# Patient Record
Sex: FEMALE | Race: WHITE | NOT HISPANIC OR LATINO
[De-identification: names, ages, dates, MRNs, and addresses within clinical notes are randomized per-mention and may not be internally consistent; named-entity substitution may affect disease eponyms.]

---

## 2018-05-12 PROBLEM — Z00.00 ENCOUNTER FOR PREVENTIVE HEALTH EXAMINATION: Status: ACTIVE | Noted: 2018-05-12

## 2018-05-23 ENCOUNTER — RECORD ABSTRACTING (OUTPATIENT)
Age: 68
End: 2018-05-23

## 2018-05-23 DIAGNOSIS — Z87.898 PERSONAL HISTORY OF OTHER SPECIFIED CONDITIONS: ICD-10-CM

## 2018-05-23 DIAGNOSIS — M19.90 UNSPECIFIED OSTEOARTHRITIS, UNSPECIFIED SITE: ICD-10-CM

## 2018-05-23 DIAGNOSIS — Z87.09 PERSONAL HISTORY OF OTHER DISEASES OF THE RESPIRATORY SYSTEM: ICD-10-CM

## 2018-05-23 DIAGNOSIS — Z78.9 OTHER SPECIFIED HEALTH STATUS: ICD-10-CM

## 2018-05-23 DIAGNOSIS — R59.0 LOCALIZED ENLARGED LYMPH NODES: ICD-10-CM

## 2018-05-23 RX ORDER — ASCORBIC ACID 500 MG
TABLET ORAL
Refills: 0 | Status: ACTIVE | COMMUNITY

## 2018-06-22 ENCOUNTER — APPOINTMENT (OUTPATIENT)
Dept: PLASTIC SURGERY | Facility: CLINIC | Age: 68
End: 2018-06-22
Payer: SELF-PAY

## 2018-06-22 VITALS
TEMPERATURE: 98.4 F | RESPIRATION RATE: 18 BRPM | WEIGHT: 134 LBS | HEART RATE: 71 BPM | SYSTOLIC BLOOD PRESSURE: 122 MMHG | DIASTOLIC BLOOD PRESSURE: 81 MMHG | HEIGHT: 66 IN | OXYGEN SATURATION: 99 % | BODY MASS INDEX: 21.53 KG/M2

## 2018-06-22 PROCEDURE — 99211 OFF/OP EST MAY X REQ PHY/QHP: CPT | Mod: NC

## 2018-06-22 PROCEDURE — 99024 POSTOP FOLLOW-UP VISIT: CPT

## 2019-01-04 ENCOUNTER — MESSAGE (OUTPATIENT)
Age: 69
End: 2019-01-04

## 2019-02-06 ENCOUNTER — MESSAGE (OUTPATIENT)
Age: 69
End: 2019-02-06

## 2019-02-11 ENCOUNTER — APPOINTMENT (OUTPATIENT)
Dept: PLASTIC SURGERY | Facility: HOSPITAL | Age: 69
End: 2019-02-11
Payer: MEDICARE

## 2019-02-11 PROCEDURE — 19328 RMVL INTACT BREAST IMPLANT: CPT | Mod: NC,59

## 2019-02-11 PROCEDURE — 19340 INSJ BREAST IMPLT SM D MAST: CPT

## 2019-02-15 ENCOUNTER — APPOINTMENT (OUTPATIENT)
Dept: PLASTIC SURGERY | Facility: CLINIC | Age: 69
End: 2019-02-15
Payer: SELF-PAY

## 2019-02-15 PROCEDURE — 99024 POSTOP FOLLOW-UP VISIT: CPT

## 2019-02-20 NOTE — HISTORY OF PRESENT ILLNESS
[FreeTextEntry1] : pt is s/p removal capsules and saline implants and replacement with gel implants differentially sized  The patient denies fever,chills, shortness of breath, chest pain, calf pain. there is no evidence of seroma or infection  scar well healed .  good shape and symmetry  pt states l breast feeling is less, but thick capsule present on that side may have included nerve or cautery may have affected nerve temporarily.  will observe for nipple areolar function to determine if loss is reversible

## 2019-02-20 NOTE — ASSESSMENT
[FreeTextEntry1] : s/p removal capsules bilat and replacement with gel implants . pt is doing well instructions reviewed rto 6 weeks for ck

## 2019-03-19 ENCOUNTER — APPOINTMENT (OUTPATIENT)
Dept: PLASTIC SURGERY | Facility: CLINIC | Age: 69
End: 2019-03-19
Payer: SELF-PAY

## 2019-03-19 PROCEDURE — 99024 POSTOP FOLLOW-UP VISIT: CPT

## 2019-03-19 NOTE — ASSESSMENT
[FreeTextEntry1] : pt is happy with her repair and hoping for more feeling in l breast.  she is happy not to have contracture recur at this point as I am also. pt instructions reviewed  rto annually for ck and take antibiotics with teeth cleaning or infections that might seed breast implant

## 2019-03-19 NOTE — HISTORY OF PRESENT ILLNESS
[FreeTextEntry1] : pt is doing well l breast sensation may be returning as she feels some vague discomfort. no recurrence of contracture and she is very happy with size and shape.  on examination there is grade 1 capsules bilaterally with decreased sensation l side  nl on r.  disc complete capsulectomy of severe capsule on l may have damaged either temporarily or permanently the nerve to nipple sensation. it may return in time .

## 2019-10-25 ENCOUNTER — APPOINTMENT (OUTPATIENT)
Dept: BREAST CENTER | Facility: CLINIC | Age: 69
End: 2019-10-25
Payer: MEDICARE

## 2019-10-25 VITALS
DIASTOLIC BLOOD PRESSURE: 78 MMHG | WEIGHT: 135 LBS | SYSTOLIC BLOOD PRESSURE: 114 MMHG | BODY MASS INDEX: 21.69 KG/M2 | HEIGHT: 66 IN | HEART RATE: 64 BPM

## 2019-10-25 PROCEDURE — 99215 OFFICE O/P EST HI 40 MIN: CPT

## 2019-10-25 RX ORDER — FLUTICASONE PROPIONATE 110 UG/1
110 AEROSOL, METERED RESPIRATORY (INHALATION)
Refills: 0 | Status: ACTIVE | COMMUNITY

## 2019-10-25 NOTE — PHYSICAL EXAM
[Normocephalic] : normocephalic [Atraumatic] : atraumatic [Supple] : supple [No Supraclavicular Adenopathy] : no supraclavicular adenopathy [Examined in the supine and seated position] : examined in the supine and seated position [Symmetrical] : symmetrical [No dominant masses] : no dominant masses in right breast  [No dominant masses] : no dominant masses left breast [No Nipple Retraction] : no left nipple retraction [No Nipple Discharge] : no left nipple discharge [No Axillary Lymphadenopathy] : no left axillary lymphadenopathy [No Edema] : no edema [No Rashes] : no rashes [No Ulceration] : no ulceration [de-identified] : implants soft, symmetric no masses [de-identified] : in area of patient concern left axilla, no masses

## 2019-10-25 NOTE — ASSESSMENT
[FreeTextEntry1] : 70 yo female with FCD, dense breast\par plan mg/sono SEP 2020\par reviewed mri screening for silicone implant evaluation\par We reviewed risk reduction strategies including maintaining a BMI <25, limiting red meat intake and alcoholic beverages to 3 per week and exercise (150 min/ week low intensity or 75 min/week high intensity). And maintaining a normal vitamin D level.\par vitamin e oilmassage daily\par f/u with me 4 months if not improved with pain or next year\par She knows to call or return sooner should any concerns or questions arise.\par \par

## 2019-10-25 NOTE — REVIEW OF SYSTEMS
[Hoarseness] : hoarseness [Constipation] : constipation [Ear Discharge] : ear discharge [Lower Back Pain] : lower back pain [Joint Pain] : joint pain [Negative] : Heme/Lymph [FreeTextEntry4] : sinus pressure [FreeTextEntry9] : neck pain

## 2019-10-25 NOTE — HISTORY OF PRESENT ILLNESS
[FreeTextEntry1] : This is a 69 year old female s/p left breast ultrasound guided biopsy on 08/10/2015. Pathology showed a benign reactive lymph node with features of dramatic opacity lymphadenitis and no metastatic carcinoma present and no features of malignant lymphoma. Patient originally referred by Dr. Villavicencio for a palpable right axillary mass. She said when she was younger she had "bumps" under her arms off and on which would self-resolve. She had bilateral retropectoral saline implants by Dr. Farley in 1993. \par \par SHe is s/p implant exchange and replacement 2/2019. She has retropectoral silicone implants. \par \par She does SBE. \par She has not noticed a change in her breast or a breast lump. Her left breast always feels "harder" than her right.\par She has not noticed a change in her nipple or nipple area.\par She has not noticed a change in the skin of the breast.\par She is not experiencing nipple discharge.\par She is not experiencing breast pain.\par She has not noticed a lump or lymph node under armpit. \par \par BREAST CANCER RISK FACTORS\par Menarche: 16\par Menopause: 57\par Grav:  5    Para:  2 (44, 38)\par Age at first live birth: 27\par Nursed: Y\par Hysterectomy: Y on 08/12/2015\par Oophorectomy: N\par OCP:  yes 5-7years\par HRT: no\par Last pap/pelvic exam: 6/2019 HPV WNL \par Related family history:  mother BCA@68, paternal 1st cousin ovarian CA @58\par Ashkenazi: no\par Tyrer-Lequire/NCI lifetime risk: TCv7 13.8, TCv6 8.3%, BRCAPRO 7.3%, Ina 11.6%, Ac 4.6% % mastery done\par BRCA testing: no\par Bra size: 36B\par \par Last mammogram:  9/6/2019           Location: [facility]\par Report reviewed.                                 Images reviewed \par Results: Birads 2\par No evidence of malignancy \par \par Last ultrasound:   9/6/2019          Location: [facility]\par Report reviewed.                                 Images reviewed\par Results: Birads 2\par No evidence of malignancy. Stable oval hypoechoic structures in the left 3:00 position 2cm from the nipple measuring 0.5x0.2x0.6cm and right 1:00 position 0.4x0.2x0.4cm. They likely represent mildly complicated cysts or benign appearing nodules. The previously seen left axillary lymph node measuring 2.4x0.8x2.3cm is without significant change. \par \par Last MRI:  never

## 2020-10-19 ENCOUNTER — APPOINTMENT (OUTPATIENT)
Dept: BREAST CENTER | Facility: CLINIC | Age: 70
End: 2020-10-19
Payer: MEDICARE

## 2020-10-19 VITALS
HEART RATE: 68 BPM | BODY MASS INDEX: 21.69 KG/M2 | HEIGHT: 66 IN | WEIGHT: 135 LBS | SYSTOLIC BLOOD PRESSURE: 116 MMHG | DIASTOLIC BLOOD PRESSURE: 73 MMHG

## 2020-10-19 DIAGNOSIS — Z80.41 FAMILY HISTORY OF MALIGNANT NEOPLASM OF OVARY: ICD-10-CM

## 2020-10-19 DIAGNOSIS — Z80.0 FAMILY HISTORY OF MALIGNANT NEOPLASM OF DIGESTIVE ORGANS: ICD-10-CM

## 2020-10-19 DIAGNOSIS — Z80.3 FAMILY HISTORY OF MALIGNANT NEOPLASM OF BREAST: ICD-10-CM

## 2020-10-19 PROCEDURE — 99215 OFFICE O/P EST HI 40 MIN: CPT

## 2020-10-19 NOTE — HISTORY OF PRESENT ILLNESS
[FreeTextEntry1] : This is a 70 year old female s/p left breast ultrasound guided biopsy on 08/10/2015. Pathology showed a benign reactive lymph node with features of dramatic opacity lymphadenitis and no metastatic carcinoma present and no features of malignant lymphoma. Patient originally referred by Dr. Villavicencio for a palpable right axillary mass. She said when she was younger she had "bumps" under her arms off and on which would self-resolve. She had bilateral retropectoral saline implants by Dr. Farley in 1993. \par \par SHe is s/p implant exchange and replacement 2/2019. She has retropectoral silicone implants. Her ex- passed last week from ALS.  Cee Blake passed 6/2020.\par \par She does SBE. \par She has not noticed a change in her breast or a breast lump. Her left breast always feels "harder" than her right.\par She has not noticed a change in her nipple or nipple area.\par She has not noticed a change in the skin of the breast.\par She is not experiencing nipple discharge.\par She is not experiencing breast pain.\par She has not noticed a lump or lymph node under armpit. \par \par BREAST CANCER RISK FACTORS\par Menarche: 16\par Menopause: 57\par Grav:  5    Para:  2 (43, 40)\par Age at first live birth: 27\par Nursed: Y\par Hysterectomy: Y on 08/12/2015\par Oophorectomy: N\par OCP:  yes 5-7years\par HRT: no\par Last pap/pelvic exam: 2020  WNL \par Related family history:  mother BCA@68, paternal 1st cousin ovarian CA @58\par Ashkenazi: no\par Tyrer-Kent/NCI lifetime risk: TCv7 13.8, TCv6 8.3%, BRCAPRO 7.3%, Ina 11.6%, Ac 4.6% % mastery done\par BRCA testing: no\par Bra size: 36B\par \par Last mammogram:  9/11/2020          Location: [facility]\par Report reviewed.                                 Images unable to be reviewed \par Results: Birads 1\par No evidence of malignancy \par \par Last ultrasound:  10/15/2020          Location: [facility]\par Report reviewed.                                 Images unable to be reviewed\par Results: \par prominent left axillary node 2cm without change\par \par Last MRI:  never

## 2020-10-19 NOTE — ASSESSMENT
[FreeTextEntry1] : 68 yo female with FCD, dense breast\par plan mg/sono OCT 2021\par reviewed mri screening for silicone implant evaluation plan 2023\par We reviewed risk reduction strategies including maintaining a BMI <25, limiting red meat intake and alcoholic beverages to 3 per week and exercise (150 min/ week low intensity or 75 min/week high intensity). And maintaining a normal vitamin D level.\par vitamin e oil massage daily\par f/u 1 year\par She knows to call or return sooner should any concerns or questions arise.\par \par

## 2020-10-19 NOTE — PHYSICAL EXAM
[Supple] : supple [Normocephalic] : normocephalic [Atraumatic] : atraumatic [Examined in the supine and seated position] : examined in the supine and seated position [No Supraclavicular Adenopathy] : no supraclavicular adenopathy [Symmetrical] : symmetrical [No dominant masses] : no dominant masses in right breast  [No dominant masses] : no dominant masses left breast [No Nipple Retraction] : no left nipple retraction [No Axillary Lymphadenopathy] : no right axillary lymphadenopathy [No Nipple Discharge] : no left nipple discharge [No Edema] : no edema [No Rashes] : no rashes [No Ulceration] : no ulceration [de-identified] : implants soft, symmetric no masses [de-identified] : palpable lymph node left axilla corresponding to u/s

## 2020-10-19 NOTE — REVIEW OF SYSTEMS
[Hoarseness] : hoarseness [Ear Discharge] : ear discharge [Constipation] : constipation [Joint Pain] : joint pain [Lower Back Pain] : lower back pain [Negative] : Heme/Lymph [FreeTextEntry4] : sinus pressure [FreeTextEntry9] : neck pain

## 2021-04-15 ENCOUNTER — APPOINTMENT (OUTPATIENT)
Dept: PLASTIC SURGERY | Facility: CLINIC | Age: 71
End: 2021-04-15

## 2021-06-08 ENCOUNTER — APPOINTMENT (OUTPATIENT)
Dept: PLASTIC SURGERY | Facility: CLINIC | Age: 71
End: 2021-06-08
Payer: SELF-PAY

## 2021-06-08 PROCEDURE — 99213 OFFICE O/P EST LOW 20 MIN: CPT | Mod: NC

## 2021-06-09 NOTE — ASSESSMENT
[FreeTextEntry1] : ELAINA CATES is a suitable candidate for lower blepharoplasty and fat grafting from the lower abdomen .  Her surgery will be scheduled at a convenient time with a period of healing that may vary from patient to patient.  The usual time away from work is two to three weeks and time away from exercise is three weeks.  It will be a full three months before  ELAINA is feeling back to normal.  Elevation, lubrication, massage and cold compresses will be beneficial during the healing process.  If there is a history of an ophthalmologic problem, such as dry eye or glaucoma or diabetic ophthalmopathy, then the patient will need ophthalmology clearance  prior to the surgery.  All questions were answered and the risks, benefits, alternatives, limitations, and the permanent scars were outlined  with Ms. CATES .\par

## 2021-06-09 NOTE — HISTORY OF PRESENT ILLNESS
[FreeTextEntry1] : pt has excess skin and bags under eyes and desires lower blepharoplasty surgery  she also has some malar hypoplasia and will benefit from fat grafting to nlf lmf and malar areas bilaterally . The risks, benefits, alternatives, limitations and the permanent scars were outlined with the patient.

## 2021-07-12 ENCOUNTER — NON-APPOINTMENT (OUTPATIENT)
Age: 71
End: 2021-07-12

## 2021-08-09 ENCOUNTER — APPOINTMENT (OUTPATIENT)
Dept: PLASTIC SURGERY | Facility: HOSPITAL | Age: 71
End: 2021-08-09

## 2021-08-13 ENCOUNTER — APPOINTMENT (OUTPATIENT)
Dept: PLASTIC SURGERY | Facility: CLINIC | Age: 71
End: 2021-08-13

## 2021-09-27 ENCOUNTER — APPOINTMENT (OUTPATIENT)
Dept: BREAST CENTER | Facility: CLINIC | Age: 71
End: 2021-09-27
Payer: MEDICARE

## 2021-09-27 VITALS
WEIGHT: 129 LBS | SYSTOLIC BLOOD PRESSURE: 117 MMHG | BODY MASS INDEX: 20.73 KG/M2 | HEIGHT: 66 IN | HEART RATE: 69 BPM | DIASTOLIC BLOOD PRESSURE: 74 MMHG

## 2021-09-27 DIAGNOSIS — N60.19 DIFFUSE CYSTIC MASTOPATHY OF UNSPECIFIED BREAST: ICD-10-CM

## 2021-09-27 PROCEDURE — 99214 OFFICE O/P EST MOD 30 MIN: CPT

## 2021-09-27 NOTE — PHYSICAL EXAM
[Normocephalic] : normocephalic [Atraumatic] : atraumatic [Supple] : supple [No Supraclavicular Adenopathy] : no supraclavicular adenopathy [Examined in the supine and seated position] : examined in the supine and seated position [Symmetrical] : symmetrical [No dominant masses] : no dominant masses in right breast  [No dominant masses] : no dominant masses left breast [No Nipple Retraction] : no left nipple retraction [No Nipple Discharge] : no left nipple discharge [No Axillary Lymphadenopathy] : no left axillary lymphadenopathy [No Edema] : no edema [No Rashes] : no rashes [No Ulceration] : no ulceration [de-identified] : implants soft, symmetric no masses [de-identified] : palpable lymph node left axilla corresponding to u/s

## 2021-09-27 NOTE — HISTORY OF PRESENT ILLNESS
[FreeTextEntry1] : This is a 71 year old female s/p left breast ultrasound guided biopsy on 08/10/2015. Pathology showed a benign reactive lymph node with features of dramatic opacity lymphadenitis and no metastatic carcinoma present and no features of malignant lymphoma. Patient originally referred by Dr. Villavicencio for a palpable right axillary mass. She said when she was younger she had "bumps" under her arms off and on which would self-resolve. She had bilateral retropectoral saline implants by Dr. Farley in 1993. \par \par SHe is s/p implant exchange and replacement 2/2019. She has retropectoral silicone implants. Her ex- passed last week from ALS.  Cee Blake passed 6/2020.\par \par She started lots of herbs and supplements with Nasra Lieberman. \par \par She does SBE. \par She has not noticed a change in her breast or a breast lump. Her left breast always feels "harder" than her right.\par She has not noticed a change in her nipple or nipple area.\par She has not noticed a change in the skin of the breast.\par She is not experiencing nipple discharge.\par She is experiencing left breast pain which started a few weeks ago. Sharp 8/10, doesn't radiate, self resolves, daily.\par She has not noticed a lump or lymph node under armpit. \par \par BREAST CANCER RISK FACTORS\par Menarche: 16\par Menopause: 57\par Grav:  5    Para:  2 (43, 40)\par Age at first live birth: 27\par Nursed: Y\par Hysterectomy: Y on 08/12/2015\par Oophorectomy: N\par OCP:  yes 5-7years\par HRT: no\par Last pap/pelvic exam: 6/2021 WNL \par Related family history:  mother BCA@68, paternal 1st cousin ovarian CA @58\par Ashkenazi: no\par Beau-Torres/NCI lifetime risk: TCv7 13.8, TCv6 8.3%, BRCAPRO 7.3%, Ina 11.6%, Ac 4.6% % mastery done\par BRCA testing: no\par Bra size: 36B\par \par Last mammogram:  9/21/2021             Location: [facility]\par Report reviewed.                                 Images unable to be reviewed \par Results: Birads 2\par No evidence of malignancy \par \par Last ultrasound: 9/21/2021         Location: [facility]\par Report reviewed.                        Images unable to be reviewed\par Results: BIRADS 2\par no evidence of malignancy\par \par Last MRI:  never

## 2021-09-27 NOTE — ASSESSMENT
[FreeTextEntry1] : 70 yo female with FCD, dense breast\par plan mg/sono 9/2022\par reviewed mri screening for silicone implant evaluation plan 2023\par We reviewed risk reduction strategies including maintaining a BMI <25, limiting red meat intake and alcoholic beverages to 3 per week and exercise (150 min/ week low intensity or 75 min/week high intensity). And maintaining a normal vitamin D level.\par vitamin e oil massage daily\par advised her to see Dr. Hernández to evaluate for ACP re: left chest sided pain\par f/u 1 year\par She knows to call or return sooner should any concerns or questions arise.\par \par

## 2021-09-27 NOTE — REVIEW OF SYSTEMS
[Hoarseness] : hoarseness [Ear Discharge] : ear discharge [Constipation] : constipation [Lower Back Pain] : lower back pain [Negative] : Heme/Lymph [FreeTextEntry4] : sinus pressure [FreeTextEntry9] : neck pain

## 2021-09-27 NOTE — CONSULT LETTER
[Dear  ___] : Dear  [unfilled], [Referral Letter:] : I am referring [unfilled] to you for further evaluation.  My most recent evaluation follows. [Please see my note below.] : Please see my note below. [Consult Closing:] : Thank you very much for allowing me to participate in the care of this patient.  If you have any questions, please do not hesitate to contact me. [Sincerely,] : Sincerely, [FreeTextEntry1] : Please evaluate her for possible atypical chest pain. [FreeTextEntry3] : Jeny Mccarthy MS DO\par Breast Surgeon\par [facility] \[place] [address]\par

## 2022-07-07 ENCOUNTER — NON-APPOINTMENT (OUTPATIENT)
Age: 72
End: 2022-07-07

## 2022-08-16 ENCOUNTER — APPOINTMENT (OUTPATIENT)
Dept: PLASTIC SURGERY | Facility: CLINIC | Age: 72
End: 2022-08-16

## 2022-08-16 VITALS
OXYGEN SATURATION: 98 % | DIASTOLIC BLOOD PRESSURE: 69 MMHG | TEMPERATURE: 98.4 F | HEART RATE: 69 BPM | SYSTOLIC BLOOD PRESSURE: 140 MMHG

## 2022-08-16 PROCEDURE — 99213 OFFICE O/P EST LOW 20 MIN: CPT

## 2022-08-17 NOTE — HISTORY OF PRESENT ILLNESS
[FreeTextEntry1] : ELAINA is s/p uncomplicated breast augmentation years ago and is presently complaining of left breast pain 12 ;00 above nipple and for the past 6 months increasing in intensity .  She is also followed by Dr Greene for breasts and has an appointment to be seen iin 2 weeks. The risks, benefits, alternatives, limitations and the permanent scars were outlined with the patient. Pt denies sleeping on breasts and notes a chronic sensory loss on that side since the augmentation .  She denies recent trauma or infections and does not have contractures

## 2022-08-17 NOTE — ASSESSMENT
[FreeTextEntry1] : ELAINA Buckner has no mass or contracture and is not sleeping on the breast . HEr surgery was years ago and a new neuroma from surgery is unlikely .  I see no physical evidence of any problem but we will order her mammo sono early and anticipate her being seen also by Dr Greene

## 2022-09-30 ENCOUNTER — NON-APPOINTMENT (OUTPATIENT)
Age: 72
End: 2022-09-30

## 2022-10-04 ENCOUNTER — APPOINTMENT (OUTPATIENT)
Dept: BREAST CENTER | Facility: CLINIC | Age: 72
End: 2022-10-04

## 2022-10-04 VITALS
WEIGHT: 129 LBS | HEART RATE: 71 BPM | HEIGHT: 66 IN | SYSTOLIC BLOOD PRESSURE: 135 MMHG | DIASTOLIC BLOOD PRESSURE: 85 MMHG | BODY MASS INDEX: 20.73 KG/M2

## 2022-10-04 DIAGNOSIS — Z98.82 BREAST IMPLANT STATUS: ICD-10-CM

## 2022-10-04 DIAGNOSIS — R92.8 OTHER ABNORMAL AND INCONCLUSIVE FINDINGS ON DIAGNOSTIC IMAGING OF BREAST: ICD-10-CM

## 2022-10-04 PROCEDURE — 99215 OFFICE O/P EST HI 40 MIN: CPT

## 2022-10-04 NOTE — HISTORY OF PRESENT ILLNESS
[FreeTextEntry1] : This is a 72 year old female s/p left breast ultrasound guided biopsy on 08/10/2015. Pathology showed a benign reactive lymph node with features of dramatic opacity lymphadenitis and no metastatic carcinoma present and no features of malignant lymphoma. Patient originally referred by Dr. Villavicencio for a palpable right axillary mass. She said when she was younger she had "bumps" under her arms off and on which would self-resolve. She had bilateral retropectoral saline implants by Dr. Farley in 1993. \par \par SHe is s/p implant exchange and replacement 2/2019. She has retropectoral silicone implants. Her ex- passed last week from ALS.  Cee Blake passed 6/2020.\par \par She started lots of herbs and supplements with Nasra Lieberman. \par \par She does SBE. \par She has not noticed a change in her breast or a breast lump. Her left breast always feels "harder" than her right.\par She has not noticed a change in her nipple or nipple area.\par She has not noticed a change in the skin of the breast.\par She is not experiencing nipple discharge.\par She is experiencing left breast pain which started a few weeks ago. Sharp 8/10, doesn't radiate, self resolves, daily.\par She has not noticed a lump or lymph node under armpit. \par \par BREAST CANCER RISK FACTORS\par Menarche: 16\par Menopause: 57\par Grav:  5    Para:  2 (43, 40)\par Age at first live birth: 27\par Nursed: Y\par Hysterectomy: Y on 08/12/2015\par Oophorectomy: N\par OCP:  yes 5-7years\par HRT: no\par Last pap/pelvic exam: 6/2022 WNL \par Related family history:  mother BCA@68, paternal 1st cousin ovarian CA @58\par Ashkenazi: no\par Beau-Torres/NCI lifetime risk: TCv7 13.8, TCv6 8.3%, BRCAPRO 7.3%, Ina 11.6%, Ac 4.6% % mastery done\par BRCA testing: no\par Bra size: 36B\par \par Last mammogram:  10/3/2022             Location: [facility]\par Report reviewed.                                 Images unable to be reviewed \par Results: Birads 2\par Dense breasts, no evidence of malignancy.\par \par Last ultrasound: 10/3/2022         Location: [facility]\par Report reviewed.                        Images unable to be reviewed\par Results: BIRADS 4\par Right breast, stable with no suspicious findings. \par Left breast, Developing nodule at 6:00; USGbx recommended.\par \par Last MRI:  never

## 2022-10-04 NOTE — CONSULT LETTER
[Dear  ___] : Dear  [unfilled], [Referral Letter:] : I am referring [unfilled] to you for further evaluation.  My most recent evaluation follows. [Please see my note below.] : Please see my note below. [Consult Closing:] : Thank you very much for allowing me to participate in the care of this patient.  If you have any questions, please do not hesitate to contact me. [Sincerely,] : Sincerely, [FreeTextEntry1] : Please evaluate her for possible atypical chest pain. [FreeTextEntry3] : Jeny Mccarthy MS DO\par Breast Surgeon\par [facility] \[place] [address]\par

## 2022-10-04 NOTE — ASSESSMENT
[FreeTextEntry1] : 71 yo female with FCD, dense breast\par Plan left breast USGBx\par reviewed mri screening for silicone implant evaluation plan 2023\par We reviewed risk reduction strategies including maintaining a BMI <25, limiting red meat intake and alcoholic beverages to 3 per week and exercise (150 min/ week low intensity or 75 min/week high intensity). And maintaining a normal vitamin D level.\par discussed common etiologies of breast pain including caffeine, hormones and stress\par discussed stopping all of her numerous supplements and that may causing her pain\par I will call with pathology and go from there\par She knows to call or return sooner should any concerns or questions arise.\par \par

## 2022-10-04 NOTE — PHYSICAL EXAM
[Normocephalic] : normocephalic [Atraumatic] : atraumatic [Supple] : supple [No Supraclavicular Adenopathy] : no supraclavicular adenopathy [Examined in the supine and seated position] : examined in the supine and seated position [Symmetrical] : symmetrical [No dominant masses] : no dominant masses in right breast  [No dominant masses] : no dominant masses left breast [No Nipple Retraction] : no left nipple retraction [No Nipple Discharge] : no left nipple discharge [No Axillary Lymphadenopathy] : no left axillary lymphadenopathy [No Edema] : no edema [No Rashes] : no rashes [No Ulceration] : no ulceration [de-identified] : implants soft, symmetric no masses [de-identified] : no masses or skin changes [de-identified] : palpable lymph node left axilla corresponding to u/s

## 2022-10-11 ENCOUNTER — NON-APPOINTMENT (OUTPATIENT)
Age: 72
End: 2022-10-11

## 2022-10-12 ENCOUNTER — NON-APPOINTMENT (OUTPATIENT)
Age: 72
End: 2022-10-12

## 2022-10-12 DIAGNOSIS — Z98.890 OTHER SPECIFIED POSTPROCEDURAL STATES: ICD-10-CM

## 2022-10-24 ENCOUNTER — NON-APPOINTMENT (OUTPATIENT)
Age: 72
End: 2022-10-24

## 2023-04-04 ENCOUNTER — APPOINTMENT (OUTPATIENT)
Dept: PLASTIC SURGERY | Facility: CLINIC | Age: 73
End: 2023-04-04
Payer: SELF-PAY

## 2023-04-04 VITALS
DIASTOLIC BLOOD PRESSURE: 80 MMHG | OXYGEN SATURATION: 99 % | TEMPERATURE: 97.8 F | SYSTOLIC BLOOD PRESSURE: 132 MMHG | HEART RATE: 60 BPM

## 2023-04-04 PROCEDURE — 99204 OFFICE O/P NEW MOD 45 MIN: CPT | Mod: NC

## 2023-04-04 NOTE — ASSESSMENT
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  Ms. ELAINA CATES has consulted regarding facial plastic surgery.  She is prepared for temporal facelift with autologous fat grafting  to the nasolabial folds, labial mandibular folds and malar areas bilaterally.  Leesa addition she is prepared for skin excision and canthal tightening bilaterally on the lower eyelids ELAINA understands the limitations of surgical tightening and that there are other maintenance issues that will not resolve with surgery,  such as textural concerns, muscular and other rhytids and creases due to natural aging and habitual facial movements.  She will consider the material risks, benefits, alternatives, limitations and the permanent scars and will schedule surgery at a convenient time.

## 2023-04-04 NOTE — HISTORY OF PRESENT ILLNESS
[FreeTextEntry1] : ELAINA CATES is a 72 year old White  female complaining of recurrent  facial ptosis as she had a prior temporal lift years ago , She has mild platysma banding which does not concern her.  She  desires facial rejuvenation with surgery. The risks, benefits, alternatives, limitations , and the permanent scars were outlined with the patient.  In addition to facial ptosis, she has atrophy and descent of the malar fat pad areas, cheek pads , and creases in the areas of the nasolabial fords and labial mandibular folds  to autologous fat grafting from the abdominal subcutaneous tissues to the nasolabial folds, labial mandibular folds and malar areas bilaterally.  All questions were answered and the patient is a healthy non- smoker and prepared to schedule the surgery in the near future.  She will get medical clearance prior to the surgery . ELAINA  is also complaining of the aged appearance of her  lower eyelids and she had a lower blepharoplasty in september complicated by hematoma and required emergency canthotomy .  She has skin laxity on both lower eyelids l more than r , along with hollowness .  On the lower eyelids, there is some skin puffiness as well, along with a tear trough deformity below her removed  bags.  The canthus is mildly lax and will need to be tightened.  There is some skin excess and we discussed a scar  just below the lash line on the lower eyelids and a k-z repair of the r  lateral lower eyelid . We also discussed canthal support with suture tightening.  The patient is a good candidate for the proposed surgery and will benefit from the procedure. All questions were answered and the risks, benefits, alternatives, limitations, and permanent scars were outlined with her.

## 2023-04-04 NOTE — PHYSICAL EXAM
[NI] : Normal [de-identified] : r canthal laxity with medial migration of eyelashes and canthal laxity  left eyelid with skin laxity and canthal laxity

## 2023-04-10 ENCOUNTER — APPOINTMENT (OUTPATIENT)
Dept: BREAST CENTER | Facility: CLINIC | Age: 73
End: 2023-04-10
Payer: MEDICARE

## 2023-04-10 VITALS
HEART RATE: 70 BPM | HEIGHT: 66 IN | DIASTOLIC BLOOD PRESSURE: 79 MMHG | SYSTOLIC BLOOD PRESSURE: 124 MMHG | BODY MASS INDEX: 20.73 KG/M2 | WEIGHT: 129 LBS

## 2023-04-10 DIAGNOSIS — Z12.31 ENCOUNTER FOR SCREENING MAMMOGRAM FOR MALIGNANT NEOPLASM OF BREAST: ICD-10-CM

## 2023-04-10 DIAGNOSIS — R92.2 INCONCLUSIVE MAMMOGRAM: ICD-10-CM

## 2023-04-10 PROCEDURE — 99214 OFFICE O/P EST MOD 30 MIN: CPT

## 2023-04-10 RX ORDER — UBIDECARENONE 50 MG
CAPSULE ORAL
Refills: 0 | Status: DISCONTINUED | COMMUNITY
End: 2023-04-10

## 2023-04-10 RX ORDER — VITAMIN E 268 MG
CAPSULE ORAL
Refills: 0 | Status: DISCONTINUED | COMMUNITY
End: 2023-04-10

## 2023-04-10 RX ORDER — BACILLUS COAGULANS/INULIN 1B-250 MG
CAPSULE ORAL
Refills: 0 | Status: DISCONTINUED | COMMUNITY
End: 2023-04-10

## 2023-04-10 NOTE — PHYSICAL EXAM
[Normocephalic] : normocephalic [Atraumatic] : atraumatic [Supple] : supple [No Supraclavicular Adenopathy] : no supraclavicular adenopathy [Examined in the supine and seated position] : examined in the supine and seated position [Symmetrical] : symmetrical [No dominant masses] : no dominant masses in right breast  [No dominant masses] : no dominant masses left breast [No Nipple Retraction] : no left nipple retraction [No Nipple Discharge] : no left nipple discharge [No Axillary Lymphadenopathy] : no left axillary lymphadenopathy [No Edema] : no edema [No Rashes] : no rashes [No Ulceration] : no ulceration [de-identified] : implants soft, symmetric no masses [de-identified] : no masses or skin changes

## 2023-04-10 NOTE — HISTORY OF PRESENT ILLNESS
[FreeTextEntry1] : This is a 72 year old female s/p left breast ultrasound guided biopsy on 08/10/2015. Pathology showed a benign reactive lymph node with features of dramatic opacity lymphadenitis and no metastatic carcinoma present and no features of malignant lymphoma. Patient originally referred by Dr. Villavicencio for a palpable right axillary mass. She said when she was younger she had "bumps" under her arms off and on which would self-resolve. She had bilateral retropectoral saline implants by Dr. Farley in 1993. \par \par She is s/p implant exchange and replacement 2/2019. She has retropectoral silicone implants. Her ex- passed last week from ALS.  Cee Blake passed 6/2020.\par \par She started lots of herbs and supplements with Nasra Luisana. \par \par She is s/p left USGbx on 10/6/2022, pathology showed fibroadenomatous nodule. She has no breast complaints today. She stopped all of her supplements.\par \par She does SBE. \par She has not noticed a change in her breast or a breast lump. Her left breast always feels "harder" than her right.\par She has not noticed a change in her nipple or nipple area.\par She has not noticed a change in the skin of the breast.\par She is not experiencing nipple discharge.\par She is experiencing left breast pain is improved, decreased frequency, sharp but quickly resolves.\par She has not noticed a lump or lymph node under armpit. \par \par BREAST CANCER RISK FACTORS\par Menarche: 16\par Menopause: 57\par Grav:  5    Para:  2 (45, 42)\par Age at first live birth: 27\par Nursed: Y\par Hysterectomy: Y on 08/12/2015\par Oophorectomy: N\par OCP:  yes 5-7years\par HRT: no\par Last pap/pelvic exam: 6/2022 WNL \par Related family history:  mother BCA@68, paternal 1st cousin ovarian CA @58\par Ashkenazi: no\par Tyrer-Torres/NCI lifetime risk: TCv7 13.8, TCv6 8.3%, BRCAPRO 7.3%, Ina 11.6%, Ac 4.6% % mastery done\par BRCA testing: no\par Bra size: 36B\par \par Last mammogram:  10/3/2022             Location: [facility]\par Report reviewed.                                 Images reviewed \par Results: Birads 2\par Dense breasts, no evidence of malignancy.\par \par Last ultrasound: 4/4/2023 left         Location: [facility]\par Report reviewed.                        Images  reviewed\par Results: BIRADS 2\par Benign-appearing nodule (fibroadenomatous nodule on pathology) with interval decrease in size.\par \par Last MRI:  never

## 2023-04-10 NOTE — CONSULT LETTER
[Dear  ___] : Dear  [unfilled], [Referral Letter:] : I am referring [unfilled] to you for further evaluation.  My most recent evaluation follows. [Please see my note below.] : Please see my note below. [Consult Closing:] : Thank you very much for allowing me to participate in the care of this patient.  If you have any questions, please do not hesitate to contact me. [Sincerely,] : Sincerely, [FreeTextEntry1] : Please evaluate her for possible atypical chest pain. [FreeTextEntry3] : Jeny Mccarthy MS DO\par Breast Surgeon\par [facility] \[place] [address]\par

## 2023-09-20 ENCOUNTER — TRANSCRIPTION ENCOUNTER (OUTPATIENT)
Age: 73
End: 2023-09-20

## 2023-09-20 ENCOUNTER — APPOINTMENT (OUTPATIENT)
Dept: PLASTIC SURGERY | Facility: HOSPITAL | Age: 73
End: 2023-09-20
Payer: SELF-PAY

## 2023-09-20 PROCEDURE — 15773 GRFG AUTOL FAT LIPO 25 CC/<: CPT | Mod: 59

## 2023-09-20 PROCEDURE — 15820 BLEPHAROPLASTY LOWER EYELID: CPT | Mod: 59

## 2023-09-20 PROCEDURE — 15828 RHYTIDECTOMY CHEEK CHN & NCK: CPT

## 2023-09-26 ENCOUNTER — APPOINTMENT (OUTPATIENT)
Dept: PLASTIC SURGERY | Facility: CLINIC | Age: 73
End: 2023-09-26
Payer: SELF-PAY

## 2023-09-26 VITALS — HEART RATE: 83 BPM | OXYGEN SATURATION: 97 % | DIASTOLIC BLOOD PRESSURE: 76 MMHG | SYSTOLIC BLOOD PRESSURE: 116 MMHG

## 2023-09-26 PROCEDURE — 99024 POSTOP FOLLOW-UP VISIT: CPT

## 2023-09-29 ENCOUNTER — APPOINTMENT (OUTPATIENT)
Dept: PLASTIC SURGERY | Facility: CLINIC | Age: 73
End: 2023-09-29
Payer: SELF-PAY

## 2023-09-29 PROCEDURE — 99024 POSTOP FOLLOW-UP VISIT: CPT

## 2023-10-10 ENCOUNTER — APPOINTMENT (OUTPATIENT)
Dept: PLASTIC SURGERY | Facility: CLINIC | Age: 73
End: 2023-10-10
Payer: SELF-PAY

## 2023-10-10 DIAGNOSIS — L57.4 CUTIS LAXA SENILIS: ICD-10-CM

## 2023-10-10 PROCEDURE — 99024 POSTOP FOLLOW-UP VISIT: CPT

## 2023-11-28 DIAGNOSIS — H02.35 BLEPHAROCHALASIS RIGHT LOWER EYELID: ICD-10-CM

## 2023-11-28 DIAGNOSIS — H02.32 BLEPHAROCHALASIS RIGHT LOWER EYELID: ICD-10-CM

## 2023-11-29 PROBLEM — L57.4 CUTIS LAXA SENILIS: Status: ACTIVE | Noted: 2023-10-10

## 2023-12-05 ENCOUNTER — APPOINTMENT (OUTPATIENT)
Dept: PLASTIC SURGERY | Facility: CLINIC | Age: 73
End: 2023-12-05
Payer: MEDICARE

## 2023-12-05 VITALS
TEMPERATURE: 97.9 F | DIASTOLIC BLOOD PRESSURE: 86 MMHG | RESPIRATION RATE: 20 BRPM | HEIGHT: 66 IN | HEART RATE: 71 BPM | WEIGHT: 130 LBS | SYSTOLIC BLOOD PRESSURE: 133 MMHG | BODY MASS INDEX: 20.89 KG/M2 | OXYGEN SATURATION: 100 %

## 2023-12-05 VITALS
OXYGEN SATURATION: 99 % | RESPIRATION RATE: 20 BRPM | HEART RATE: 60 BPM | SYSTOLIC BLOOD PRESSURE: 119 MMHG | DIASTOLIC BLOOD PRESSURE: 72 MMHG

## 2023-12-05 PROCEDURE — 14060 TIS TRNFR E/N/E/L 10 SQ CM/<: CPT | Mod: 79

## 2023-12-08 ENCOUNTER — APPOINTMENT (OUTPATIENT)
Dept: PLASTIC SURGERY | Facility: HOSPITAL | Age: 73
End: 2023-12-08
Payer: SELF-PAY

## 2023-12-08 DIAGNOSIS — H02.112 CICATRICIAL ECTROPION OF RIGHT LOWER EYELID: ICD-10-CM

## 2023-12-08 PROCEDURE — 99024 POSTOP FOLLOW-UP VISIT: CPT

## 2023-12-12 ENCOUNTER — APPOINTMENT (OUTPATIENT)
Dept: PLASTIC SURGERY | Facility: CLINIC | Age: 73
End: 2023-12-12
Payer: SELF-PAY

## 2023-12-12 PROCEDURE — 99024 POSTOP FOLLOW-UP VISIT: CPT

## 2024-01-09 PROBLEM — H02.112 CICATRICIAL ECTROPION OF RIGHT LOWER EYELID: Status: ACTIVE | Noted: 2023-12-05

## 2024-06-24 ENCOUNTER — APPOINTMENT (OUTPATIENT)
Dept: BREAST CENTER | Facility: CLINIC | Age: 74
End: 2024-06-24
Payer: MEDICARE

## 2024-06-24 VITALS
HEIGHT: 66 IN | BODY MASS INDEX: 20.89 KG/M2 | DIASTOLIC BLOOD PRESSURE: 82 MMHG | WEIGHT: 130 LBS | SYSTOLIC BLOOD PRESSURE: 131 MMHG | OXYGEN SATURATION: 100 % | HEART RATE: 69 BPM

## 2024-06-24 DIAGNOSIS — N64.4 MASTODYNIA: ICD-10-CM

## 2024-06-24 DIAGNOSIS — N60.19 DIFFUSE CYSTIC MASTOPATHY OF UNSPECIFIED BREAST: ICD-10-CM

## 2024-06-24 PROCEDURE — 99214 OFFICE O/P EST MOD 30 MIN: CPT

## 2024-06-24 RX ORDER — OMEGA-3/DHA/EPA/FISH OIL 300-1000MG
CAPSULE ORAL
Refills: 0 | Status: ACTIVE | COMMUNITY

## 2024-06-24 RX ORDER — MELOXICAM 15 MG/1
TABLET ORAL DAILY
Refills: 0 | Status: DISCONTINUED | COMMUNITY
End: 2024-06-24

## 2024-06-24 RX ORDER — OXYCODONE AND ACETAMINOPHEN 5; 325 MG/1; MG/1
5-325 TABLET ORAL
Qty: 10 | Refills: 0 | Status: DISCONTINUED | COMMUNITY
Start: 2023-11-28 | End: 2024-06-24

## 2024-06-24 RX ORDER — DIAZEPAM 5 MG/1
5 TABLET ORAL
Qty: 2 | Refills: 0 | Status: DISCONTINUED | COMMUNITY
Start: 2023-11-28 | End: 2024-06-24

## 2024-06-24 RX ORDER — CEPHALEXIN 500 MG/1
500 TABLET ORAL
Qty: 20 | Refills: 0 | Status: DISCONTINUED | COMMUNITY
Start: 2023-11-28 | End: 2024-06-24

## 2024-06-24 NOTE — CONSULT LETTER
[Dear  ___] : Dear  [unfilled], [Referral Letter:] : I am referring [unfilled] to you for further evaluation.  My most recent evaluation follows. [Please see my note below.] : Please see my note below. [Consult Closing:] : Thank you very much for allowing me to participate in the care of this patient.  If you have any questions, please do not hesitate to contact me. [Sincerely,] : Sincerely, [FreeTextEntry1] : Please evaluate her for possible atypical chest pain. [FreeTextEntry3] : Jeny Mccarthy MS DO\par  Breast Surgeon\par  [facility] \[place] [address]\par

## 2024-06-24 NOTE — PHYSICAL EXAM
[Normocephalic] : normocephalic [Atraumatic] : atraumatic [Supple] : supple [No Supraclavicular Adenopathy] : no supraclavicular adenopathy [Examined in the supine and seated position] : examined in the supine and seated position [Symmetrical] : symmetrical [No dominant masses] : no dominant masses in right breast  [No dominant masses] : no dominant masses left breast [No Nipple Retraction] : no left nipple retraction [No Nipple Discharge] : no left nipple discharge [No Axillary Lymphadenopathy] : no left axillary lymphadenopathy [No Edema] : no edema [No Rashes] : no rashes [No Ulceration] : no ulceration [de-identified] : palpable soft nodule right [de-identified] : implants soft, symmetric no masses [de-identified] : no masses or skin changes [de-identified] : soft node

## 2024-06-24 NOTE — HISTORY OF PRESENT ILLNESS
[FreeTextEntry1] : This is a 73 year old female s/p left breast ultrasound guided biopsy on 08/10/2015. Pathology showed a benign reactive lymph node with features of dramatic opacity lymphadenitis and no metastatic carcinoma present and no features of malignant lymphoma. Patient originally referred by Dr. Villavicencio for a palpable right axillary mass. She said when she was younger she had "bumps" under her arms off and on which would self-resolve. She had bilateral retropectoral saline implants by Dr. Farley in 1993.   She is s/p implant exchange and replacement 2/2019. She has retropectoral silicone implants. Her ex- passed last week from ALS.  Cee Blake passed 6/2020.  She started lots of herbs and supplements with Nasra Lieberman.   She is s/p left USGbx on 10/6/2022, pathology showed fibroadenomatous nodule. She has no breast complaints today. She stopped all of her supplements.  She does SBE.  She has not noticed a change in her breast or a breast lump. Her left breast always feels "harder" than her right. She has not noticed a change in her nipple or nipple area. She has not noticed a change in the skin of the breast. She is not experiencing nipple discharge. She is experiencing left breast pain is improved, decreased frequency, sharp but quickly resolves. She has not noticed a lump or lymph node under armpit.   BREAST CANCER RISK FACTORS Menarche: 16 Menopause: 57 Grav:  5    Para:  2 (45, 42) Age at first live birth: 27 Nursed: Y Hysterectomy: Y on 08/12/2015 Oophorectomy: N OCP:  yes 5-7years HRT: no Last pap/pelvic exam: 6/2022 WNL  Related family history:  mother BCA@68, paternal 1st cousin ovarian CA @58 Ashkenazi: no Tyrer-Torres/NCI lifetime risk: TCv7 13.8, TCv6 8.3%, BRCAPRO 7.3%, Ina 11.6%, Ac 4.6% % mastery done BRCA testing: no Bra size: 36B  Last mammogram:  10/3/2022             Location: [facility] Report reviewed.                                 Images reviewed  Results: Birads 2 Dense breasts, no evidence of malignancy.  Last ultrasound: 4/4/2023 left         Location: [facility] Report reviewed.                        Images  reviewed Results: BIRADS 2 Benign-appearing nodule (fibroadenomatous nodule on pathology) with interval decrease in size.  Last MRI:  never

## 2024-06-24 NOTE — ASSESSMENT
[FreeTextEntry1] : 74 yo female with FCD, dense breast plan mg/MRI 10/2024 reviewed mri screening for silicone implant evaluation plan 2024 We reviewed risk reduction strategies including maintaining a BMI <25, limiting red meat intake and alcoholic beverages to 3 per week and exercise (150 min/ week low intensity or 75 min/week high intensity). And maintaining a normal vitamin D level.  discussed common etiologies of breast pain including caffeine, hormones and stress  She knows to call or return sooner should any concerns or questions arise.

## 2024-09-26 ENCOUNTER — NON-APPOINTMENT (OUTPATIENT)
Age: 74
End: 2024-09-26

## 2024-10-18 ENCOUNTER — NON-APPOINTMENT (OUTPATIENT)
Age: 74
End: 2024-10-18

## 2024-12-02 ENCOUNTER — NON-APPOINTMENT (OUTPATIENT)
Age: 74
End: 2024-12-02

## 2024-12-02 DIAGNOSIS — R93.89 ABNORMAL FINDINGS ON DIAGNOSTIC IMAGING OF OTHER SPECIFIED BODY STRUCTURES: ICD-10-CM

## 2024-12-20 ENCOUNTER — NON-APPOINTMENT (OUTPATIENT)
Age: 74
End: 2024-12-20

## 2025-01-02 ENCOUNTER — APPOINTMENT (OUTPATIENT)
Facility: CLINIC | Age: 75
End: 2025-01-02

## 2025-05-08 ENCOUNTER — NON-APPOINTMENT (OUTPATIENT)
Age: 75
End: 2025-05-08

## 2025-09-08 ENCOUNTER — NON-APPOINTMENT (OUTPATIENT)
Age: 75
End: 2025-09-08

## 2025-09-08 ENCOUNTER — APPOINTMENT (OUTPATIENT)
Dept: BREAST CENTER | Facility: CLINIC | Age: 75
End: 2025-09-08
Payer: MEDICARE

## 2025-09-08 VITALS
DIASTOLIC BLOOD PRESSURE: 74 MMHG | BODY MASS INDEX: 21.47 KG/M2 | WEIGHT: 132 LBS | SYSTOLIC BLOOD PRESSURE: 136 MMHG | HEART RATE: 69 BPM | HEIGHT: 65.6 IN

## 2025-09-08 DIAGNOSIS — Z12.31 ENCOUNTER FOR SCREENING MAMMOGRAM FOR MALIGNANT NEOPLASM OF BREAST: ICD-10-CM

## 2025-09-08 DIAGNOSIS — Z80.0 FAMILY HISTORY OF MALIGNANT NEOPLASM OF DIGESTIVE ORGANS: ICD-10-CM

## 2025-09-08 DIAGNOSIS — N60.19 DIFFUSE CYSTIC MASTOPATHY OF UNSPECIFIED BREAST: ICD-10-CM

## 2025-09-08 DIAGNOSIS — Z80.41 FAMILY HISTORY OF MALIGNANT NEOPLASM OF OVARY: ICD-10-CM

## 2025-09-08 DIAGNOSIS — Z80.3 FAMILY HISTORY OF MALIGNANT NEOPLASM OF BREAST: ICD-10-CM

## 2025-09-08 DIAGNOSIS — N64.4 MASTODYNIA: ICD-10-CM

## 2025-09-08 PROCEDURE — 99215 OFFICE O/P EST HI 40 MIN: CPT

## 2025-09-08 RX ORDER — MAGNESIUM OXIDE/MAG AA CHELATE 300 MG
CAPSULE ORAL
Refills: 0 | Status: ACTIVE | COMMUNITY

## 2025-09-08 RX ORDER — FLUTICASONE FUROATE 50 UG/1
POWDER RESPIRATORY (INHALATION)
Refills: 0 | Status: ACTIVE | COMMUNITY

## 2025-09-08 RX ORDER — MULTIVIT-MIN/IRON/FOLIC ACID/K 18-600-40
CAPSULE ORAL
Refills: 0 | Status: ACTIVE | COMMUNITY

## 2025-09-08 RX ORDER — IPRATROPIUM BROMIDE 21 UG/1
0.03 SPRAY, METERED NASAL
Refills: 0 | Status: ACTIVE | COMMUNITY

## 2025-09-08 RX ORDER — MULTIVIT WITH MIN/ALA/HERBS 50 MG
TABLET ORAL
Refills: 0 | Status: ACTIVE | COMMUNITY

## 2025-09-08 RX ORDER — LOVASTATIN 40 MG/1
40 TABLET ORAL
Refills: 0 | Status: ACTIVE | COMMUNITY